# Patient Record
Sex: FEMALE | Race: WHITE | NOT HISPANIC OR LATINO | URBAN - METROPOLITAN AREA
[De-identification: names, ages, dates, MRNs, and addresses within clinical notes are randomized per-mention and may not be internally consistent; named-entity substitution may affect disease eponyms.]

---

## 2020-11-24 ENCOUNTER — EMERGENCY (EMERGENCY)
Facility: HOSPITAL | Age: 60
LOS: 1 days | Discharge: HOME | End: 2020-11-24
Attending: EMERGENCY MEDICINE
Payer: COMMERCIAL

## 2020-11-24 VITALS
DIASTOLIC BLOOD PRESSURE: 84 MMHG | TEMPERATURE: 98 F | WEIGHT: 169.98 LBS | SYSTOLIC BLOOD PRESSURE: 153 MMHG | HEIGHT: 62 IN | HEART RATE: 92 BPM | RESPIRATION RATE: 18 BRPM | OXYGEN SATURATION: 98 %

## 2020-11-24 VITALS
RESPIRATION RATE: 17 BRPM | TEMPERATURE: 98 F | HEART RATE: 74 BPM | SYSTOLIC BLOOD PRESSURE: 137 MMHG | DIASTOLIC BLOOD PRESSURE: 78 MMHG | OXYGEN SATURATION: 98 %

## 2020-11-24 LAB
ALBUMIN SERPL ELPH-MCNC: 3.5 G/DL — SIGNIFICANT CHANGE UP (ref 3.5–5)
ALP SERPL-CCNC: 103 U/L — SIGNIFICANT CHANGE UP (ref 40–120)
ALT FLD-CCNC: 67 U/L DA — HIGH (ref 10–60)
ANION GAP SERPL CALC-SCNC: 5 MMOL/L — SIGNIFICANT CHANGE UP (ref 5–17)
APPEARANCE UR: CLEAR — SIGNIFICANT CHANGE UP
APTT BLD: 29.8 SEC — SIGNIFICANT CHANGE UP (ref 27.5–35.5)
AST SERPL-CCNC: 41 U/L — HIGH (ref 10–40)
BASOPHILS # BLD AUTO: 0.02 K/UL — SIGNIFICANT CHANGE UP (ref 0–0.2)
BASOPHILS NFR BLD AUTO: 0.2 % — SIGNIFICANT CHANGE UP (ref 0–2)
BILIRUB SERPL-MCNC: 0.8 MG/DL — SIGNIFICANT CHANGE UP (ref 0.2–1.2)
BILIRUB UR-MCNC: NEGATIVE — SIGNIFICANT CHANGE UP
BUN SERPL-MCNC: 14 MG/DL — SIGNIFICANT CHANGE UP (ref 7–18)
CALCIUM SERPL-MCNC: 9.3 MG/DL — SIGNIFICANT CHANGE UP (ref 8.4–10.5)
CHLORIDE SERPL-SCNC: 107 MMOL/L — SIGNIFICANT CHANGE UP (ref 96–108)
CO2 SERPL-SCNC: 29 MMOL/L — SIGNIFICANT CHANGE UP (ref 22–31)
COLOR SPEC: YELLOW — SIGNIFICANT CHANGE UP
CREAT SERPL-MCNC: 0.76 MG/DL — SIGNIFICANT CHANGE UP (ref 0.5–1.3)
DIFF PNL FLD: ABNORMAL
EOSINOPHIL # BLD AUTO: 0.02 K/UL — SIGNIFICANT CHANGE UP (ref 0–0.5)
EOSINOPHIL NFR BLD AUTO: 0.2 % — SIGNIFICANT CHANGE UP (ref 0–6)
GLUCOSE SERPL-MCNC: 103 MG/DL — HIGH (ref 70–99)
GLUCOSE UR QL: NEGATIVE — SIGNIFICANT CHANGE UP
HCT VFR BLD CALC: 41.7 % — SIGNIFICANT CHANGE UP (ref 34.5–45)
HGB BLD-MCNC: 13.9 G/DL — SIGNIFICANT CHANGE UP (ref 11.5–15.5)
IMM GRANULOCYTES NFR BLD AUTO: 0.3 % — SIGNIFICANT CHANGE UP (ref 0–1.5)
INR BLD: 1.08 RATIO — SIGNIFICANT CHANGE UP (ref 0.88–1.16)
KETONES UR-MCNC: ABNORMAL
LEUKOCYTE ESTERASE UR-ACNC: ABNORMAL
LIDOCAIN IGE QN: 66 U/L — LOW (ref 73–393)
LYMPHOCYTES # BLD AUTO: 1.66 K/UL — SIGNIFICANT CHANGE UP (ref 1–3.3)
LYMPHOCYTES # BLD AUTO: 16.9 % — SIGNIFICANT CHANGE UP (ref 13–44)
MCHC RBC-ENTMCNC: 29.6 PG — SIGNIFICANT CHANGE UP (ref 27–34)
MCHC RBC-ENTMCNC: 33.3 GM/DL — SIGNIFICANT CHANGE UP (ref 32–36)
MCV RBC AUTO: 88.9 FL — SIGNIFICANT CHANGE UP (ref 80–100)
MONOCYTES # BLD AUTO: 0.61 K/UL — SIGNIFICANT CHANGE UP (ref 0–0.9)
MONOCYTES NFR BLD AUTO: 6.2 % — SIGNIFICANT CHANGE UP (ref 2–14)
NEUTROPHILS # BLD AUTO: 7.46 K/UL — HIGH (ref 1.8–7.4)
NEUTROPHILS NFR BLD AUTO: 76.2 % — SIGNIFICANT CHANGE UP (ref 43–77)
NITRITE UR-MCNC: NEGATIVE — SIGNIFICANT CHANGE UP
NRBC # BLD: 0 /100 WBCS — SIGNIFICANT CHANGE UP (ref 0–0)
PH UR: 5 — SIGNIFICANT CHANGE UP (ref 5–8)
PLATELET # BLD AUTO: 229 K/UL — SIGNIFICANT CHANGE UP (ref 150–400)
POTASSIUM SERPL-MCNC: 3.8 MMOL/L — SIGNIFICANT CHANGE UP (ref 3.5–5.3)
POTASSIUM SERPL-SCNC: 3.8 MMOL/L — SIGNIFICANT CHANGE UP (ref 3.5–5.3)
PROT SERPL-MCNC: 7.4 G/DL — SIGNIFICANT CHANGE UP (ref 6–8.3)
PROT UR-MCNC: 15
PROTHROM AB SERPL-ACNC: 12.8 SEC — SIGNIFICANT CHANGE UP (ref 10.6–13.6)
RBC # BLD: 4.69 M/UL — SIGNIFICANT CHANGE UP (ref 3.8–5.2)
RBC # FLD: 11.9 % — SIGNIFICANT CHANGE UP (ref 10.3–14.5)
SODIUM SERPL-SCNC: 141 MMOL/L — SIGNIFICANT CHANGE UP (ref 135–145)
SP GR SPEC: 1.03 — HIGH (ref 1.01–1.02)
UROBILINOGEN FLD QL: NEGATIVE — SIGNIFICANT CHANGE UP
WBC # BLD: 9.8 K/UL — SIGNIFICANT CHANGE UP (ref 3.8–10.5)
WBC # FLD AUTO: 9.8 K/UL — SIGNIFICANT CHANGE UP (ref 3.8–10.5)

## 2020-11-24 PROCEDURE — 96361 HYDRATE IV INFUSION ADD-ON: CPT

## 2020-11-24 PROCEDURE — 36415 COLL VENOUS BLD VENIPUNCTURE: CPT

## 2020-11-24 PROCEDURE — 99284 EMERGENCY DEPT VISIT MOD MDM: CPT | Mod: 25

## 2020-11-24 PROCEDURE — 81001 URINALYSIS AUTO W/SCOPE: CPT

## 2020-11-24 PROCEDURE — 74177 CT ABD & PELVIS W/CONTRAST: CPT

## 2020-11-24 PROCEDURE — 85610 PROTHROMBIN TIME: CPT

## 2020-11-24 PROCEDURE — 74177 CT ABD & PELVIS W/CONTRAST: CPT | Mod: 26

## 2020-11-24 PROCEDURE — 96374 THER/PROPH/DIAG INJ IV PUSH: CPT | Mod: XU

## 2020-11-24 PROCEDURE — 85025 COMPLETE CBC W/AUTO DIFF WBC: CPT

## 2020-11-24 PROCEDURE — 80053 COMPREHEN METABOLIC PANEL: CPT

## 2020-11-24 PROCEDURE — 99285 EMERGENCY DEPT VISIT HI MDM: CPT

## 2020-11-24 PROCEDURE — 85730 THROMBOPLASTIN TIME PARTIAL: CPT

## 2020-11-24 PROCEDURE — 83690 ASSAY OF LIPASE: CPT

## 2020-11-24 PROCEDURE — 96375 TX/PRO/DX INJ NEW DRUG ADDON: CPT

## 2020-11-24 RX ORDER — IOHEXOL 300 MG/ML
30 INJECTION, SOLUTION INTRAVENOUS ONCE
Refills: 0 | Status: COMPLETED | OUTPATIENT
Start: 2020-11-24 | End: 2020-11-24

## 2020-11-24 RX ORDER — CIPROFLOXACIN LACTATE 400MG/40ML
500 VIAL (ML) INTRAVENOUS ONCE
Refills: 0 | Status: COMPLETED | OUTPATIENT
Start: 2020-11-24 | End: 2020-11-24

## 2020-11-24 RX ORDER — PANTOPRAZOLE SODIUM 20 MG/1
40 TABLET, DELAYED RELEASE ORAL ONCE
Refills: 0 | Status: COMPLETED | OUTPATIENT
Start: 2020-11-24 | End: 2020-11-24

## 2020-11-24 RX ORDER — METRONIDAZOLE 500 MG
500 TABLET ORAL ONCE
Refills: 0 | Status: COMPLETED | OUTPATIENT
Start: 2020-11-24 | End: 2020-11-24

## 2020-11-24 RX ORDER — ONDANSETRON 8 MG/1
4 TABLET, FILM COATED ORAL ONCE
Refills: 0 | Status: COMPLETED | OUTPATIENT
Start: 2020-11-24 | End: 2020-11-24

## 2020-11-24 RX ORDER — METRONIDAZOLE 500 MG
1 TABLET ORAL
Qty: 30 | Refills: 0
Start: 2020-11-24 | End: 2020-12-03

## 2020-11-24 RX ORDER — SODIUM CHLORIDE 9 MG/ML
1000 INJECTION INTRAMUSCULAR; INTRAVENOUS; SUBCUTANEOUS ONCE
Refills: 0 | Status: COMPLETED | OUTPATIENT
Start: 2020-11-24 | End: 2020-11-24

## 2020-11-24 RX ORDER — MORPHINE SULFATE 50 MG/1
4 CAPSULE, EXTENDED RELEASE ORAL ONCE
Refills: 0 | Status: DISCONTINUED | OUTPATIENT
Start: 2020-11-24 | End: 2020-11-24

## 2020-11-24 RX ORDER — MOXIFLOXACIN HYDROCHLORIDE TABLETS, 400 MG 400 MG/1
1 TABLET, FILM COATED ORAL
Qty: 14 | Refills: 0
Start: 2020-11-24 | End: 2020-11-30

## 2020-11-24 RX ADMIN — Medication 500 MILLIGRAM(S): at 17:22

## 2020-11-24 RX ADMIN — MORPHINE SULFATE 4 MILLIGRAM(S): 50 CAPSULE, EXTENDED RELEASE ORAL at 14:02

## 2020-11-24 RX ADMIN — IOHEXOL 30 MILLILITER(S): 300 INJECTION, SOLUTION INTRAVENOUS at 14:11

## 2020-11-24 RX ADMIN — ONDANSETRON 4 MILLIGRAM(S): 8 TABLET, FILM COATED ORAL at 14:07

## 2020-11-24 RX ADMIN — SODIUM CHLORIDE 1000 MILLILITER(S): 9 INJECTION INTRAMUSCULAR; INTRAVENOUS; SUBCUTANEOUS at 17:12

## 2020-11-24 RX ADMIN — MORPHINE SULFATE 4 MILLIGRAM(S): 50 CAPSULE, EXTENDED RELEASE ORAL at 17:12

## 2020-11-24 RX ADMIN — SODIUM CHLORIDE 2000 MILLILITER(S): 9 INJECTION INTRAMUSCULAR; INTRAVENOUS; SUBCUTANEOUS at 14:01

## 2020-11-24 RX ADMIN — PANTOPRAZOLE SODIUM 40 MILLIGRAM(S): 20 TABLET, DELAYED RELEASE ORAL at 14:02

## 2020-11-24 NOTE — ED PROVIDER NOTE - CLINICAL SUMMARY MEDICAL DECISION MAKING FREE TEXT BOX
61 yo female presents with bloody diarrhea and left sided abdominal pain. Will obtain labs, CT, and will medicate. Will reassess.

## 2020-11-24 NOTE — ED PROVIDER NOTE - PATIENT PORTAL LINK FT
You can access the FollowMyHealth Patient Portal offered by [facility] by registering at the following website: [email or web address]. By joining [facility] FollowMyHealth portal, you will also be able to view your health information using other applications (apps) compatible with our system.

## 2020-11-24 NOTE — ED PROVIDER NOTE - PROGRESS NOTE DETAILS
Pt improved, unable to provide stool sample because not having diarrhea  here. Will dc to follow up with her GI. Precautions reviewed.

## 2020-11-24 NOTE — ED PROVIDER NOTE - OBJECTIVE STATEMENT
61 yo female with PMHx of abdominal pain intermittently for the last 3 years during which time patient has had endoscopy, colonoscopy, and 2 CT scans that have been normal, presents with bloody diarrhea with abdominal cramping. Patient states her bloody diarrhea stopped yesterday. Denies fever, chills, shortness of breath, chest pain, or any urinary complaints.

## 2021-11-17 NOTE — ED ADULT NURSE NOTE - NSFALLRSKOUTCOME_ED_ALL_ED
What Type Of Note Output Would You Prefer (Optional)?: Standard Output
Hpi Title: Evaluation of Skin Lesions
How Severe Are Your Spot(S)?: mild
Have Your Spot(S) Been Treated In The Past?: has not been treated
Universal Safety Interventions

## 2022-08-15 NOTE — ED PROVIDER NOTE - GASTROINTESTINAL [+], MLM
Proceed with OD first. EYE OD, IOL TYPE Toric lens, POST OPERATIVE [employer] PL/-0.50, PACKAGE Custom w/ Istent. DIARRHEA/abdominal cramping

## 2024-07-10 ENCOUNTER — NON-APPOINTMENT (OUTPATIENT)
Age: 64
End: 2024-07-10

## 2024-07-10 PROBLEM — Z00.00 ENCOUNTER FOR PREVENTIVE HEALTH EXAMINATION: Status: ACTIVE | Noted: 2024-07-10

## 2024-07-11 ENCOUNTER — APPOINTMENT (OUTPATIENT)
Age: 64
End: 2024-07-11
Payer: MEDICAID

## 2024-07-11 VITALS
BODY MASS INDEX: 30.48 KG/M2 | DIASTOLIC BLOOD PRESSURE: 84 MMHG | OXYGEN SATURATION: 98 % | WEIGHT: 172 LBS | HEART RATE: 87 BPM | SYSTOLIC BLOOD PRESSURE: 132 MMHG | HEIGHT: 63 IN

## 2024-07-11 DIAGNOSIS — T14.8XXA OTHER INJURY OF UNSPECIFIED BODY REGION, INITIAL ENCOUNTER: ICD-10-CM

## 2024-07-11 PROCEDURE — 99204 OFFICE O/P NEW MOD 45 MIN: CPT

## 2024-07-11 PROCEDURE — 73564 X-RAY EXAM KNEE 4 OR MORE: CPT | Mod: LT

## 2024-07-25 ENCOUNTER — APPOINTMENT (OUTPATIENT)
Age: 64
End: 2024-07-25

## 2024-07-25 VITALS
HEART RATE: 89 BPM | HEIGHT: 63 IN | SYSTOLIC BLOOD PRESSURE: 147 MMHG | BODY MASS INDEX: 30.48 KG/M2 | WEIGHT: 172 LBS | OXYGEN SATURATION: 98 % | DIASTOLIC BLOOD PRESSURE: 86 MMHG

## 2024-07-25 DIAGNOSIS — T14.8XXA OTHER INJURY OF UNSPECIFIED BODY REGION, INITIAL ENCOUNTER: ICD-10-CM

## 2024-07-25 PROCEDURE — 99213 OFFICE O/P EST LOW 20 MIN: CPT

## 2024-07-25 PROCEDURE — 76882 US LMTD JT/FCL EVL NVASC XTR: CPT

## 2024-07-25 NOTE — DISCUSSION/SUMMARY
[de-identified] : RAUL HERNANDEZ is a 64 year old female presenting with acute left-sided knee pain after a fall hitting the knee on pavement resulting in a Mendez Anu lesion.  X-rays negative for any intra-articular pathology and no significant suprapatellar effusion was noted on ultrasound.  Patient with mild recurrence of fluid in the Mendez Usha lesion likely approximately 30 cc compared to 180 cc that were drained at last visit.  At the proximal end of the lesion there is some thickening of the soft tissue that is firm to palpation.  There is some blanchable erythema over the area.  No functional loss or significant pain to palpation of this area.  5/5 strength full range of motion.  Discussed this may be due to hypertrophic changes from the chronic lesion that will likely improve over the course of the next weeks to months.  Recommended continued compression.  Discussed if worsening of symptoms including pain and restricted range of motion weakness or systemic symptoms such as fever or chills to follow-up as soon as possible or go to the emergency room.  Plan: 1.  Recommend continued compression for 2 weeks 2.  Discussed following up in 2 weeks for reevaluation 3.  At that time can consider referral for second opinion

## 2024-07-25 NOTE — HISTORY OF PRESENT ILLNESS
[de-identified] : RAUL HERNANDEZ is a 64 year old female presenting with acute left-sided knee pain and swelling.  She states she had a mechanical fall where she simply tripped walking down the sidewalk and smashed her left knee on the pavement.  Afterwards there was some bruising and swelling initially but she did not think much of it.  Over the course of the next 2 weeks the swelling in the anterior aspect of the knee grew significantly.  She went to urgent care and was referred over for further management.  Was unable to do x-ray to urgent care at that time.  Overall feels stable in the knee and does not have significant pain within the knee joint but is feeling significant discomfort superficial to it due to the large amount of swelling.  Interval history: Patient states the swelling has mostly gone down however a mild amount has returned to the knee over the last few weeks.  She has been using compression and a knee sleeve on and off with some relief.  She is mostly concerned about thickening and a hard feeling around the area where the swelling was just above her knee as well as the return of some of the swelling.

## 2024-07-25 NOTE — PHYSICAL EXAM
[de-identified] : General: Alert and oriented  Body habitus: Within normal limits Appearance: Within normal limits, calm  Ambulation: Within normal limits  Behavior: Within normal limits  Psychiatric: Cooperative, appropriate mood & affect  Neurologic: Alert, Oriented  Neck: No jugular venous distention  Eye: Extraocular movements are intact  Respiratory: Respirations are non-labored  Cardiovascular: Normal peripheral perfusion, no edema MSK: Mild-moderate swelling superficial to the knee joint with mild discoloration.  Thickening of the subcutaneous tissue mostly at the proximal end of the Mendez Usha lesion with blanchable erythema. [de-identified] : XR of left knee Date: 7/11/2024   Views: 4 views Performed at [facility]: Yes Impression: No fracture no dislocation good alignment.  Significant soft tissue swelling proximal superficial to the quadriceps muscle belly and tendon  These images were personally reviewed with original findings documented as above.  Limited diagnostic ultrasound in office today of the: Left knee Indication: Pain  Findings/impression:   Superficial soft tissue swelling throughout the length of the Mendez Anu lesion with soft tissue thickening noted approximately.  Within the lesion there is pockets of thickened fascia with interspersed small hypoechoic areas.  There is 1 main pocket of hypoechoic area superficial to the patella that is likely the recurrence of some of the fluid in the lesion.

## 2024-08-08 ENCOUNTER — APPOINTMENT (OUTPATIENT)
Age: 64
End: 2024-08-08

## 2024-08-29 ENCOUNTER — APPOINTMENT (OUTPATIENT)
Age: 64
End: 2024-08-29
Payer: MEDICAID

## 2024-08-29 DIAGNOSIS — T14.8XXA OTHER INJURY OF UNSPECIFIED BODY REGION, INITIAL ENCOUNTER: ICD-10-CM

## 2024-08-29 PROCEDURE — 99213 OFFICE O/P EST LOW 20 MIN: CPT

## 2024-08-29 NOTE — PHYSICAL EXAM
[de-identified] : General: Alert and oriented  Body habitus: Within normal limits Appearance: Within normal limits, calm  Ambulation: Within normal limits  Behavior: Within normal limits  Psychiatric: Cooperative, appropriate mood & affect  Neurologic: Alert, Oriented  Neck: No jugular venous distention  Eye: Extraocular movements are intact  Respiratory: Respirations are non-labored  Cardiovascular: Normal peripheral perfusion, no edema MSK: Mild swelling superficial to the knee joint with mild discoloration.  Overall significantly improved from last visit.  There is blanchable erythema in the area.  Paresthesias to touch with approximately 50% decrease in sensation over the area.  Thickening of the subcutaneous tissue mostly at the proximal end of the Mendez Lanny lesion. [de-identified] : XR of left knee Date: 7/11/2024   Views: 4 views Performed at [facility]: Yes Impression: No fracture no dislocation good alignment.  Significant soft tissue swelling proximal superficial to the quadriceps muscle belly and tendon  These images were personally reviewed with original findings documented as above.  Limited diagnostic ultrasound in office today of the: Left knee Indication: Pain  Findings/impression:   Superficial soft tissue swelling throughout the length of the Mendez Anu lesion with soft tissue thickening noted approximately.  Very minimal fluid remains.  Overall significant improvement from last visit.

## 2024-08-29 NOTE — HISTORY OF PRESENT ILLNESS
[de-identified] : RAUL HERNANDEZ is a 64 year old female presenting with acute left-sided knee pain and swelling.  She states she had a mechanical fall where she simply tripped walking down the sidewalk and smashed her left knee on the pavement.  Afterwards there was some bruising and swelling initially but she did not think much of it.  Over the course of the next 2 weeks the swelling in the anterior aspect of the knee grew significantly.  She went to urgent care and was referred over for further management.  Was unable to do x-ray to urgent care at that time.  Overall feels stable in the knee and does not have significant pain within the knee joint but is feeling significant discomfort superficial to it due to the large amount of swelling.  Interval history: Patient states the swelling continues to improve as well as the hardness she was feeling in the proximal aspect of the knee and the distal thigh.  She says overall it is not tender to touch or warm.  She says there is some numbness and tingling over the area of that she has been experiencing that comes and goes.  She is here today for further evaluation.  Denies any systemic symptoms.

## 2024-08-29 NOTE — DISCUSSION/SUMMARY
[de-identified] : RAUL HERNANDEZ is a 64 year old female presenting with acute left-sided knee pain after a fall hitting the knee on pavement resulting in a Mendez Anu lesion.  X-rays negative for any intra-articular pathology and no significant suprapatellar effusion was noted on ultrasound.  Overall the swelling has essentially completely resolved at this visit.  There is still some thickening of the soft tissue that is mildly firm to palpation but improved from last visit.  Still with blanchable erythema over the area.  No functional loss or significant pain to palpation.  Area is not tender at all.  5 out of 5 strength with full range of motion. Discussed the mild stiffness and numbness and tingling should continue to improve as the fascial planes adhere back together and the body heals the lesion.  Discussed if worsening of symptoms including pain and restricted range of motion weakness or systemic symptoms such as fever or chills to follow-up as soon as possible or go to the emergency room.  Does not appear infectious at today's visit.  Overall improving.  Plan: 1.  Continue to monitor 2.  Follow-up in 2 months if symptoms have not resolved

## 2024-12-25 NOTE — REASON FOR VISIT
[Follow-Up Visit] : a follow-up visit for [Knee Pain] : knee pain 42 y.o. Female  10 days post partum c/o  headache worse when lying down x2 days that worsened at 0200 this AM. Took tylenol and motrin without relief. Denies cp, sob, f/c, vision changes, dizziness, numbness/tingling. Pt did not have HTN prior in medical history or during pregnancy. AAOx3. Ambulates with steady gait. Conversive in clear/full sentences. BEFAST-. Pt placed on ccm/pulse ox on  arrival, VSS. 42 y.o. Female  7 days post partum c/o  headache worse when lying down x2 days that worsened at 0200 this AM. Took tylenol and motrin without relief. Denies cp, sob, f/c, vision changes, dizziness, numbness/tingling. Pt did not have HTN prior in medical history or during pregnancy. AAOx3. Ambulates with steady gait. Conversive in clear/full sentences. BEFAST-. Pt placed on ccm/pulse ox on  arrival, VSS.

## 2025-04-30 ENCOUNTER — APPOINTMENT (OUTPATIENT)
Age: 65
End: 2025-04-30